# Patient Record
Sex: FEMALE | Race: WHITE | NOT HISPANIC OR LATINO | URBAN - METROPOLITAN AREA
[De-identification: names, ages, dates, MRNs, and addresses within clinical notes are randomized per-mention and may not be internally consistent; named-entity substitution may affect disease eponyms.]

---

## 2019-04-19 ENCOUNTER — INPATIENT (INPATIENT)
Facility: HOSPITAL | Age: 79
LOS: 2 days | Discharge: NURSING, REHAB OR LONG-TERM CARE | DRG: 312 | End: 2019-04-22
Attending: INTERNAL MEDICINE | Admitting: INTERNAL MEDICINE
Payer: MEDICARE

## 2019-04-19 VITALS — HEART RATE: 69 BPM | TEMPERATURE: 98 F | OXYGEN SATURATION: 97 % | RESPIRATION RATE: 18 BRPM

## 2019-04-19 LAB
ALBUMIN SERPL ELPH-MCNC: 2.4 G/DL — LOW (ref 3.4–5)
ALP SERPL-CCNC: 50 U/L — SIGNIFICANT CHANGE UP (ref 40–120)
ALT FLD-CCNC: 14 U/L — SIGNIFICANT CHANGE UP (ref 12–42)
ANION GAP SERPL CALC-SCNC: 9 MMOL/L — SIGNIFICANT CHANGE UP (ref 9–16)
APPEARANCE UR: CLEAR — SIGNIFICANT CHANGE UP
AST SERPL-CCNC: 20 U/L — SIGNIFICANT CHANGE UP (ref 15–37)
BASOPHILS NFR BLD AUTO: 1 % — SIGNIFICANT CHANGE UP (ref 0–2)
BILIRUB SERPL-MCNC: 0.3 MG/DL — SIGNIFICANT CHANGE UP (ref 0.2–1.2)
BILIRUB UR-MCNC: NEGATIVE — SIGNIFICANT CHANGE UP
BUN SERPL-MCNC: 13 MG/DL — SIGNIFICANT CHANGE UP (ref 7–23)
CALCIUM SERPL-MCNC: 6 MG/DL — CRITICAL LOW (ref 8.5–10.5)
CHLORIDE SERPL-SCNC: 117 MMOL/L — HIGH (ref 96–108)
CK SERPL-CCNC: 109 U/L — SIGNIFICANT CHANGE UP (ref 26–192)
CO2 SERPL-SCNC: 21 MMOL/L — LOW (ref 22–31)
COLOR SPEC: YELLOW — SIGNIFICANT CHANGE UP
CREAT SERPL-MCNC: 0.39 MG/DL — LOW (ref 0.5–1.3)
DIFF PNL FLD: NEGATIVE — SIGNIFICANT CHANGE UP
EOSINOPHIL NFR BLD AUTO: 1.8 % — SIGNIFICANT CHANGE UP (ref 0–6)
GLUCOSE SERPL-MCNC: 96 MG/DL — SIGNIFICANT CHANGE UP (ref 70–99)
GLUCOSE UR QL: NEGATIVE — SIGNIFICANT CHANGE UP
HCT VFR BLD CALC: 37.6 % — SIGNIFICANT CHANGE UP (ref 34.5–45)
HGB BLD-MCNC: 12.2 G/DL — SIGNIFICANT CHANGE UP (ref 11.5–15.5)
IMM GRANULOCYTES NFR BLD AUTO: 0.2 % — SIGNIFICANT CHANGE UP (ref 0–1.5)
KETONES UR-MCNC: ABNORMAL MG/DL
LACTATE SERPL-SCNC: 1.8 MMOL/L — SIGNIFICANT CHANGE UP (ref 0.4–2)
LEUKOCYTE ESTERASE UR-ACNC: NEGATIVE — SIGNIFICANT CHANGE UP
LYMPHOCYTES # BLD AUTO: 27.3 % — SIGNIFICANT CHANGE UP (ref 13–44)
MAGNESIUM SERPL-MCNC: 1.5 MG/DL — LOW (ref 1.6–2.6)
MCHC RBC-ENTMCNC: 28 PG — SIGNIFICANT CHANGE UP (ref 27–34)
MCHC RBC-ENTMCNC: 32.4 G/DL — SIGNIFICANT CHANGE UP (ref 32–36)
MCV RBC AUTO: 86.4 FL — SIGNIFICANT CHANGE UP (ref 80–100)
MONOCYTES NFR BLD AUTO: 8.7 % — SIGNIFICANT CHANGE UP (ref 2–14)
NEUTROPHILS NFR BLD AUTO: 61 % — SIGNIFICANT CHANGE UP (ref 43–77)
NITRITE UR-MCNC: NEGATIVE — SIGNIFICANT CHANGE UP
PH UR: 6.5 — SIGNIFICANT CHANGE UP (ref 5–8)
PHOSPHATE SERPL-MCNC: 2.1 MG/DL — LOW (ref 2.5–4.5)
PLATELET # BLD AUTO: 199 K/UL — SIGNIFICANT CHANGE UP (ref 150–400)
POTASSIUM SERPL-MCNC: 2.8 MMOL/L — CRITICAL HIGH (ref 3.5–5.3)
POTASSIUM SERPL-SCNC: 2.8 MMOL/L — CRITICAL HIGH (ref 3.5–5.3)
PROT SERPL-MCNC: 4.4 G/DL — LOW (ref 6.4–8.2)
PROT UR-MCNC: NEGATIVE MG/DL — SIGNIFICANT CHANGE UP
RBC # BLD: 4.35 M/UL — SIGNIFICANT CHANGE UP (ref 3.8–5.2)
RBC # FLD: 14.1 % — SIGNIFICANT CHANGE UP (ref 10.3–14.5)
SODIUM SERPL-SCNC: 147 MMOL/L — HIGH (ref 132–145)
SP GR SPEC: 1.01 — SIGNIFICANT CHANGE UP (ref 1–1.03)
TROPONIN I SERPL-MCNC: 0.02 NG/ML — SIGNIFICANT CHANGE UP (ref 0.02–0.06)
TSH SERPL-MCNC: 0.45 UIU/ML — SIGNIFICANT CHANGE UP (ref 0.36–3.74)
UROBILINOGEN FLD QL: 0.2 E.U./DL — SIGNIFICANT CHANGE UP
WBC # BLD: 6.2 K/UL — SIGNIFICANT CHANGE UP (ref 3.8–10.5)
WBC # FLD AUTO: 6.2 K/UL — SIGNIFICANT CHANGE UP (ref 3.8–10.5)

## 2019-04-19 PROCEDURE — 93010 ELECTROCARDIOGRAM REPORT: CPT

## 2019-04-19 PROCEDURE — 70450 CT HEAD/BRAIN W/O DYE: CPT | Mod: 26

## 2019-04-19 PROCEDURE — 99285 EMERGENCY DEPT VISIT HI MDM: CPT | Mod: 25

## 2019-04-19 RX ORDER — POTASSIUM CHLORIDE 20 MEQ
10 PACKET (EA) ORAL
Qty: 0 | Refills: 0 | Status: COMPLETED | OUTPATIENT
Start: 2019-04-19 | End: 2019-04-20

## 2019-04-19 RX ORDER — MAGNESIUM SULFATE 500 MG/ML
2 VIAL (ML) INJECTION ONCE
Qty: 0 | Refills: 0 | Status: COMPLETED | OUTPATIENT
Start: 2019-04-19 | End: 2019-04-19

## 2019-04-19 RX ORDER — POTASSIUM PHOSPHATE, MONOBASIC POTASSIUM PHOSPHATE, DIBASIC 236; 224 MG/ML; MG/ML
15 INJECTION, SOLUTION INTRAVENOUS ONCE
Qty: 0 | Refills: 0 | Status: COMPLETED | OUTPATIENT
Start: 2019-04-19 | End: 2019-04-19

## 2019-04-19 RX ORDER — SODIUM CHLORIDE 9 MG/ML
1500 INJECTION INTRAMUSCULAR; INTRAVENOUS; SUBCUTANEOUS ONCE
Qty: 0 | Refills: 0 | Status: COMPLETED | OUTPATIENT
Start: 2019-04-19 | End: 2019-04-19

## 2019-04-19 RX ORDER — CALCIUM GLUCONATE 100 MG/ML
2 VIAL (ML) INTRAVENOUS ONCE
Qty: 0 | Refills: 0 | Status: COMPLETED | OUTPATIENT
Start: 2019-04-19 | End: 2019-04-19

## 2019-04-19 RX ORDER — POTASSIUM CHLORIDE 20 MEQ
40 PACKET (EA) ORAL ONCE
Qty: 0 | Refills: 0 | Status: COMPLETED | OUTPATIENT
Start: 2019-04-19 | End: 2019-04-19

## 2019-04-19 RX ADMIN — SODIUM CHLORIDE 3000 MILLILITER(S): 9 INJECTION INTRAMUSCULAR; INTRAVENOUS; SUBCUTANEOUS at 20:39

## 2019-04-19 RX ADMIN — Medication 50 GRAM(S): at 23:01

## 2019-04-19 RX ADMIN — Medication 100 MILLIEQUIVALENT(S): at 23:01

## 2019-04-19 NOTE — ED PROVIDER NOTE - OBJECTIVE STATEMENT
78 yof pw weakness, LH/near syncope today at the restaurant.  pt was with family, visiting from [place] yesterday, reported a lot of walking/sight seeing both days.  tonight at dinner, pt states she felt weak, w/ near syncope, lowered her head on the table.  while her family was trying to get her to a taxi, they felt pt didn't have a lot of strength.  family reports pt was able to converse appropriately w/o slur of speech or noted any facial droop.  no reported LOC, or urinary/bm incontinence.  family does note pt would intermittently shake her arm.  EMT also reported similar findings upon arrival.  pt denies pain/sob/nv/headache.  family reports possible hx of tia/sz in the past (dx years ago in [place]), only takes aspirin, hx of low blood pressure.

## 2019-04-19 NOTE — ED PROVIDER NOTE - PHYSICAL EXAMINATION
CON: ao x 3, HENMT: clear oropharynx, soft neck, HEAD: atraumatic, CV: rrr, equal pulses b/l, RESP: cta b/l, GI: +BS, soft, nontender, no rebound, no guarding, SKIN: no rash, MSK: no deformities, NEURO: ao x 3, clear speech, no facial asymmetry, no rigidity, no clonus, noted intermittent myoclonus of the right arm

## 2019-04-19 NOTE — ED ADULT NURSE NOTE - CHIEF COMPLAINT QUOTE
BIBA for sudden episode of weakness and 'jerking arm movements' while at dinner with family. Patient c/o mild headache. As per family members, hx 'a few seizures in the past and a couple of mini strokes.' Otherwise no past medical history and only takes 1 baby asa daily. Unable to obtain blood pressure in triage due to jerking arm movements.

## 2019-04-19 NOTE — ED ADULT NURSE NOTE - OBJECTIVE STATEMENT
77 yo F presents to ED with family yfn tran. Pt family states they are visiting [facility] and have been walking around all day and during dinner the patient started stating she was not feeling well and felt "high and weak". Pt has noted twitching to bl arms at this time. Pt is a&Ox2 at this time. Pt has sign of trauma not head or body. no facial droop noted. Pt family states pt has no LOC. No slurred speech noted.

## 2019-04-19 NOTE — ED PROVIDER NOTE - CLINICAL SUMMARY MEDICAL DECISION MAKING FREE TEXT BOX
generalized weakness/fatigue, near syncope, no syncope, hx not c/w sz, though noted myoclonus, possibly secondary myoclonus, no rigidity or clonus on exam,  family reports pt prone to dehydration, dec UOP in the last 2 days, will check lytes, CT head, UA eval for possible infection

## 2019-04-19 NOTE — ED ADULT NURSE NOTE - NSIMPLEMENTINTERV_GEN_ALL_ED
Implemented All Fall with Harm Risk Interventions:  [place] to call system. Call bell, personal items and telephone within reach. Instruct patient to call for assistance. Room bathroom lighting operational. Non-slip footwear when patient is off stretcher. Physically safe environment: no spills, clutter or unnecessary equipment. Stretcher in lowest position, wheels locked, appropriate side rails in place. Provide visual cue, wrist band, yellow gown, etc. Monitor gait and stability. Monitor for mental status changes and reorient to person, place, and time. Review medications for side effects contributing to fall risk. Reinforce activity limits and safety measures with patient and family. Provide visual clues: red socks.

## 2019-04-19 NOTE — ED ADULT TRIAGE NOTE - CHIEF COMPLAINT QUOTE
BIBA for sudden episode of weakness and 'jerking arm movements' while at dinner with family. Patient c/o mild headache. As per family members, hx 'a few seizures in the past and a couple of mini strokes.' Otherwise no past medical history and only takes 1 baby asa daily. BIBA for sudden episode of weakness and 'jerking arm movements' while at dinner with family. Patient c/o mild headache. As per family members, hx 'a few seizures in the past and a couple of mini strokes.' Otherwise no past medical history and only takes 1 baby asa daily. Unable to obtain blood pressure in triage due to jerking arm movements.

## 2019-04-19 NOTE — ED PROVIDER NOTE - CARE PLAN
Principal Discharge DX:	Weakness  Secondary Diagnosis:	Hypocalcemia  Secondary Diagnosis:	Hypokalemia  Secondary Diagnosis:	Hypomagnesemia  Secondary Diagnosis:	Hypophosphatemia  Secondary Diagnosis:	Hypernatremia

## 2019-04-20 DIAGNOSIS — E83.39 OTHER DISORDERS OF PHOSPHORUS METABOLISM: ICD-10-CM

## 2019-04-20 DIAGNOSIS — R55 SYNCOPE AND COLLAPSE: ICD-10-CM

## 2019-04-20 DIAGNOSIS — E87.0 HYPEROSMOLALITY AND HYPERNATREMIA: ICD-10-CM

## 2019-04-20 DIAGNOSIS — E83.42 HYPOMAGNESEMIA: ICD-10-CM

## 2019-04-20 DIAGNOSIS — Z91.89 OTHER SPECIFIED PERSONAL RISK FACTORS, NOT ELSEWHERE CLASSIFIED: ICD-10-CM

## 2019-04-20 DIAGNOSIS — Z29.9 ENCOUNTER FOR PROPHYLACTIC MEASURES, UNSPECIFIED: ICD-10-CM

## 2019-04-20 DIAGNOSIS — E87.6 HYPOKALEMIA: ICD-10-CM

## 2019-04-20 LAB
ALBUMIN SERPL ELPH-MCNC: 3.6 G/DL — SIGNIFICANT CHANGE UP (ref 3.3–5)
ALP SERPL-CCNC: 54 U/L — SIGNIFICANT CHANGE UP (ref 40–120)
ALT FLD-CCNC: 18 U/L — SIGNIFICANT CHANGE UP (ref 10–45)
ANION GAP SERPL CALC-SCNC: 11 MMOL/L — SIGNIFICANT CHANGE UP (ref 5–17)
ANION GAP SERPL CALC-SCNC: 9 MMOL/L — SIGNIFICANT CHANGE UP (ref 5–17)
AST SERPL-CCNC: 32 U/L — SIGNIFICANT CHANGE UP (ref 10–40)
BILIRUB SERPL-MCNC: 0.3 MG/DL — SIGNIFICANT CHANGE UP (ref 0.2–1.2)
BUN SERPL-MCNC: 11 MG/DL — SIGNIFICANT CHANGE UP (ref 7–23)
BUN SERPL-MCNC: 9 MG/DL — SIGNIFICANT CHANGE UP (ref 7–23)
CALCIUM SERPL-MCNC: 8.4 MG/DL — SIGNIFICANT CHANGE UP (ref 8.4–10.5)
CALCIUM SERPL-MCNC: 8.8 MG/DL — SIGNIFICANT CHANGE UP (ref 8.4–10.5)
CHLORIDE SERPL-SCNC: 108 MMOL/L — SIGNIFICANT CHANGE UP (ref 96–108)
CHLORIDE SERPL-SCNC: 110 MMOL/L — HIGH (ref 96–108)
CK SERPL-CCNC: 152 U/L — SIGNIFICANT CHANGE UP (ref 25–170)
CO2 SERPL-SCNC: 20 MMOL/L — LOW (ref 22–31)
CO2 SERPL-SCNC: 22 MMOL/L — SIGNIFICANT CHANGE UP (ref 22–31)
CREAT SERPL-MCNC: 0.49 MG/DL — LOW (ref 0.5–1.3)
CREAT SERPL-MCNC: 0.51 MG/DL — SIGNIFICANT CHANGE UP (ref 0.5–1.3)
EXTRA LAVENDER TOP TUBE: SIGNIFICANT CHANGE UP
EXTRA SST TUBE: SIGNIFICANT CHANGE UP
FOLATE SERPL-MCNC: >20 NG/ML — SIGNIFICANT CHANGE UP
GLUCOSE SERPL-MCNC: 101 MG/DL — HIGH (ref 70–99)
GLUCOSE SERPL-MCNC: 112 MG/DL — HIGH (ref 70–99)
HCT VFR BLD CALC: 35.2 % — SIGNIFICANT CHANGE UP (ref 34.5–45)
HGB BLD-MCNC: 11.1 G/DL — LOW (ref 11.5–15.5)
MAGNESIUM SERPL-MCNC: 2.5 MG/DL — SIGNIFICANT CHANGE UP (ref 1.6–2.6)
MCHC RBC-ENTMCNC: 28 PG — SIGNIFICANT CHANGE UP (ref 27–34)
MCHC RBC-ENTMCNC: 31.5 GM/DL — LOW (ref 32–36)
MCV RBC AUTO: 88.7 FL — SIGNIFICANT CHANGE UP (ref 80–100)
NRBC # BLD: 0 /100 WBCS — SIGNIFICANT CHANGE UP (ref 0–0)
PHOSPHATE SERPL-MCNC: 4 MG/DL — SIGNIFICANT CHANGE UP (ref 2.5–4.5)
PLATELET # BLD AUTO: 196 K/UL — SIGNIFICANT CHANGE UP (ref 150–400)
POTASSIUM SERPL-MCNC: 4.7 MMOL/L — SIGNIFICANT CHANGE UP (ref 3.5–5.3)
POTASSIUM SERPL-MCNC: 5.4 MMOL/L — HIGH (ref 3.5–5.3)
POTASSIUM SERPL-SCNC: 4.7 MMOL/L — SIGNIFICANT CHANGE UP (ref 3.5–5.3)
POTASSIUM SERPL-SCNC: 5.4 MMOL/L — HIGH (ref 3.5–5.3)
PROT SERPL-MCNC: 5.9 G/DL — LOW (ref 6–8.3)
RBC # BLD: 3.97 M/UL — SIGNIFICANT CHANGE UP (ref 3.8–5.2)
RBC # FLD: 13.7 % — SIGNIFICANT CHANGE UP (ref 10.3–14.5)
SODIUM SERPL-SCNC: 139 MMOL/L — SIGNIFICANT CHANGE UP (ref 135–145)
SODIUM SERPL-SCNC: 141 MMOL/L — SIGNIFICANT CHANGE UP (ref 135–145)
VIT B12 SERPL-MCNC: 471 PG/ML — SIGNIFICANT CHANGE UP (ref 232–1245)
WBC # BLD: 7.96 K/UL — SIGNIFICANT CHANGE UP (ref 3.8–10.5)
WBC # FLD AUTO: 7.96 K/UL — SIGNIFICANT CHANGE UP (ref 3.8–10.5)

## 2019-04-20 PROCEDURE — 93010 ELECTROCARDIOGRAM REPORT: CPT

## 2019-04-20 PROCEDURE — 99223 1ST HOSP IP/OBS HIGH 75: CPT

## 2019-04-20 PROCEDURE — 71045 X-RAY EXAM CHEST 1 VIEW: CPT | Mod: 26

## 2019-04-20 RX ORDER — LANOLIN ALCOHOL/MO/W.PET/CERES
5 CREAM (GRAM) TOPICAL ONCE
Qty: 0 | Refills: 0 | Status: COMPLETED | OUTPATIENT
Start: 2019-04-20 | End: 2019-04-20

## 2019-04-20 RX ORDER — ASPIRIN/CALCIUM CARB/MAGNESIUM 324 MG
1 TABLET ORAL
Qty: 0 | Refills: 0 | COMMUNITY

## 2019-04-20 RX ORDER — HEPARIN SODIUM 5000 [USP'U]/ML
5000 INJECTION INTRAVENOUS; SUBCUTANEOUS EVERY 8 HOURS
Qty: 0 | Refills: 0 | Status: DISCONTINUED | OUTPATIENT
Start: 2019-04-20 | End: 2019-04-22

## 2019-04-20 RX ORDER — LANOLIN ALCOHOL/MO/W.PET/CERES
5 CREAM (GRAM) TOPICAL AT BEDTIME
Qty: 0 | Refills: 0 | Status: DISCONTINUED | OUTPATIENT
Start: 2019-04-20 | End: 2019-04-22

## 2019-04-20 RX ORDER — ASPIRIN/CALCIUM CARB/MAGNESIUM 324 MG
81 TABLET ORAL DAILY
Qty: 0 | Refills: 0 | Status: DISCONTINUED | OUTPATIENT
Start: 2019-04-20 | End: 2019-04-22

## 2019-04-20 RX ADMIN — Medication 200 GRAM(S): at 00:03

## 2019-04-20 RX ADMIN — Medication 100 MILLIEQUIVALENT(S): at 01:54

## 2019-04-20 RX ADMIN — Medication 5 MILLIGRAM(S): at 22:50

## 2019-04-20 RX ADMIN — Medication 40 MILLIEQUIVALENT(S): at 00:35

## 2019-04-20 RX ADMIN — HEPARIN SODIUM 5000 UNIT(S): 5000 INJECTION INTRAVENOUS; SUBCUTANEOUS at 06:14

## 2019-04-20 RX ADMIN — HEPARIN SODIUM 5000 UNIT(S): 5000 INJECTION INTRAVENOUS; SUBCUTANEOUS at 22:28

## 2019-04-20 RX ADMIN — POTASSIUM PHOSPHATE, MONOBASIC POTASSIUM PHOSPHATE, DIBASIC 62.5 MILLIMOLE(S): 236; 224 INJECTION, SOLUTION INTRAVENOUS at 00:34

## 2019-04-20 RX ADMIN — Medication 81 MILLIGRAM(S): at 13:07

## 2019-04-20 RX ADMIN — Medication 100 MILLIEQUIVALENT(S): at 00:03

## 2019-04-20 RX ADMIN — HEPARIN SODIUM 5000 UNIT(S): 5000 INJECTION INTRAVENOUS; SUBCUTANEOUS at 14:08

## 2019-04-20 RX ADMIN — Medication 5 MILLIGRAM(S): at 20:44

## 2019-04-20 NOTE — H&P ADULT - NSHPPHYSICALEXAM_GEN_ALL_CORE
.  VITAL SIGNS:  T(C): 36.7 (04-20-19 @ 01:38), Max: 36.8 (04-19-19 @ 20:50)  T(F): 98.1 (04-20-19 @ 01:38), Max: 98.2 (04-19-19 @ 20:50)  HR: 65 (04-20-19 @ 01:38) (64 - 78)  BP: 126/72 (04-20-19 @ 01:38) (126/72 - 173/79)  BP(mean): --  RR: 18 (04-20-19 @ 01:38) (18 - 18)  SpO2: 95% (04-20-19 @ 01:38) (95% - 98%)  Wt(kg): --    PHYSICAL EXAM:    Constitutional: WDWN resting comfortably in bed; NAD  Head: NC/AT  Eyes: PERRL, EOMI  ENT: no nasal discharge  Neck: supple  Respiratory: CTA b/l, no increased work of breathing  Cardiac: +S1/S2, regular rate  Gastrointestinal: soft, distended, nontender to palpation, no rebound or guarding; +BS  Extremities: WWP, no peripheral edema  Musculoskeletal: NROM x4  Dermatologic: skin warm, dry  Neurologic: Alert and oriented. PERRL, EOMI. Face symmetric to eyebrow raise and smile. Tongue protrusion midline. Visual fields intact. Shoulder shrug 5/5. Motor 5/5 in all extremities. Sensation intact to light touch. No dysarthria. No focal deficits appreciated.   Psychiatric: affect and characteristics of appearance, verbalizations, behaviors are appropriate

## 2019-04-20 NOTE — H&P ADULT - PROBLEM SELECTOR PLAN 7
1) PCP Unknown  Contacted on Admission: Patient and family at bedside do not recall PCP. She is [place] of [place]. Will attempt to find PCP   2) Date of Contact with PCP:  3) PCP Contacted at Discharge: (Y/N)  4) Summary of Handoff Given to PCP:   5) Post-Discharge Appointment Date and Location:

## 2019-04-20 NOTE — H&P ADULT - ATTENDING COMMENTS
Patient was seen and examined by me at bedside. I agree with resident's note, subjective, objective physical exam, assessment and plan with following modifications/additions.      1) movement disorder (choreiform movements) --DDx partial seizures? chorea? restless leg?  * Will discuss with patient and family possibility of vEEG  * Need Neurology consultation.   * Family denies home medications.     2) electrolyte disturbance.   resolved.

## 2019-04-20 NOTE — H&P ADULT - HISTORY OF PRESENT ILLNESS
Patient is a 78 year old F with PMH HLD who presents for presyncope. Patient was in her usual state of health prior to Friday, visiting the city with her daughter and out at dinner at 5:30pm. When appetizers came, she told her daughter she suddenly felt "high", and that stationary objects appeared to be moving. She had associated dizziness, and was speaking very slowly. Her daughter and son went to take her home from dinner but she felt very tired and generalized weakness, so they called EMS. At the time of the incident, she denies blurry vision, slurred speech, facial droop, or focal motor/sensory deficits. Of note, patient with recent carotid ultrasound without significant stenosis per daughter    At [facility] ED T36.6, HR 69, bp 130-170s, RR18 sat 97% RA. Patient noted to have several lab abnormalities including Na 147, K 2.8, Cl 117, calcium 6.0, Mg 1.5. UA negative. CT head noncontrast negative for acute pathology. Patient was given 2g Ca, 2g Mg, 40meq K, 15mMol K Phos, and a 1.5L NS bolus. Concern for contamination of labs by fluids.

## 2019-04-20 NOTE — PHYSICAL THERAPY INITIAL EVALUATION ADULT - LEVEL OF INDEPENDENCE: SIT/STAND, REHAB EVAL
unable to perform/secondary to poor static sitting tolerance requiring max assist to prevent patient from thrashing about with torso and head/neck

## 2019-04-20 NOTE — H&P ADULT - PROBLEM SELECTOR PLAN 3
- Patient with hypertension on admission, hypernatremia with hypokalemia. May benefit from workup of secondary causes of hypertension. F/u renin:aldost ratio

## 2019-04-20 NOTE — PHYSICAL THERAPY INITIAL EVALUATION ADULT - CRITERIA FOR SKILLED THERAPEUTIC INTERVENTIONS
impairments found/functional limitations in following categories/risk reduction/prevention/rehab potential/therapy frequency/anticipated discharge recommendation

## 2019-04-20 NOTE — PHYSICAL THERAPY INITIAL EVALUATION ADULT - DISCHARGE DISPOSITION, PT EVAL
TBD pending further exam/evaluation and further work up by medical team prior to PT intervention regarding flailing/thrashing/jerking motions upon transition of supine to sit

## 2019-04-20 NOTE — PHYSICAL THERAPY INITIAL EVALUATION ADULT - BED MOBILITY LIMITATIONS, REHAB EVAL
decreased ability to use legs for bridging/pushing/flailing movements of neck and trunk with supine to sit transition with two "jerking" motions noted with eye closing. MD (Kvng) informed immediately

## 2019-04-20 NOTE — PHYSICAL THERAPY INITIAL EVALUATION ADULT - GENERAL OBSERVATIONS, REHAB EVAL
Received supine in bed with +family present, on room air, +hep lock, in no apparent distress Patient appears to be resting comfortably in bed

## 2019-04-20 NOTE — H&P ADULT - PROBLEM SELECTOR PLAN 1
- Patient with self-reported poor PO intake and several episodes in the past of presyncope  - Carotid U/S normal per daughter at bedside  - EKG no acute changes, TWI V1-V2 with no symptoms unlikely ACS. Trop and CK negative  - Unlikely seizure given event description though could consider EEG if additional workup negative  - Less likely stroke given global deficits and transient nature, more likely metabolic  - Replete lytes and monitor neurologic status  - Daughter at bedside reports normal echo, obtain collateral. Consider echo if not obtainable

## 2019-04-20 NOTE — PHYSICAL THERAPY INITIAL EVALUATION ADULT - PLANNED THERAPY INTERVENTIONS, PT EVAL
strengthening/transfer training/gait training/postural re-education/balance training/bed mobility training

## 2019-04-20 NOTE — PHYSICAL THERAPY INITIAL EVALUATION ADULT - MODALITIES TREATMENT COMMENTS
FIM locomotion TBA; FIM stairs TBA; patient displays oscillopsia upon supine to sit transition with self-reported severe dizziness with patient stating that the room looked as though it were moving, not spinning

## 2019-04-20 NOTE — PHYSICAL THERAPY INITIAL EVALUATION ADULT - LEVEL OF INDEPENDENCE: GAIT, REHAB EVAL
unable to perform/flailing/thrashing movements of trunk/head/neck with endorsed dizziness and room moving

## 2019-04-20 NOTE — H&P ADULT - NSHPLABSRESULTS_GEN_ALL_CORE
.  LABS:                         11.1   7.96  )-----------( 196      ( 2019 02:55 )             35.2         147<H>  |  117<H>  |  13  ----------------------------<  96  2.8<HH>   |  21<L>  |  0.39<L>    Ca    6.0<LL>      2019 20:38  Phos  2.1       Mg     1.5         TPro  4.4<L>  /  Alb  2.4<L>  /  TBili  0.3  /  DBili  x   /  AST  20  /  ALT  14  /  AlkPhos  50        Urinalysis Basic - ( 2019 22:43 )    Color: Yellow / Appearance: Clear / S.010 / pH: x  Gluc: x / Ketone: Trace mg/dL  / Bili: NEGATIVE / Urobili: 0.2 E.U./dL   Blood: x / Protein: NEGATIVE mg/dL / Nitrite: NEGATIVE   Leuk Esterase: NEGATIVE / RBC: x / WBC x   Sq Epi: x / Non Sq Epi: x / Bacteria: x      CARDIAC MARKERS ( 2019 20:38 )  0.020 ng/mL / x     / 109 U/L / x     / x            Lactate, Blood: 1.8 mmoL/L ( @ 20:38)      RADIOLOGY, EKG & ADDITIONAL TESTS: Reviewed.

## 2019-04-20 NOTE — H&P ADULT - NSHPREVIEWOFSYSTEMS_GEN_ALL_CORE
REVIEW OF SYSTEMS:    CONSTITUTIONAL: Generalized weakness as per HPI  EYES/ENT: Visual changes as per HPI, now resolved  NECK: No pain  RESPIRATORY: No cough, wheezing, hemoptysis; No shortness of breath  CARDIOVASCULAR: No chest pain or palpitations  GASTROINTESTINAL: No abdominal or epigastric pain. No nausea, vomiting, or hematemesis; No diarrhea or constipation.  GENITOURINARY: No dysuria  NEUROLOGICAL: As per HPI  SKIN: No itching, burning, rashes, or lesions   All other review of systems is negative unless indicated above.

## 2019-04-20 NOTE — PHYSICAL THERAPY INITIAL EVALUATION ADULT - PERTINENT HX OF CURRENT PROBLEM, REHAB EVAL
Patient is a 78 year old F with PMH HLD who presents for presyncope. Patient was in her usual state of health prior to Friday, visiting the city with her daughter and out at dinner at 5:30pm. When appetizers came, she told her daughter she suddenly felt "high", and that stationary objects appeared to be moving. She had associated dizziness, and was speaking very slowly. Her daughter and son went to take her home from dinner but she felt very tired and generalized weakness, so they called EMS.

## 2019-04-20 NOTE — PHYSICAL THERAPY INITIAL EVALUATION ADULT - ADDITIONAL COMMENTS
Patient lives alone in an independent living facility where she was previously independent for all functional mobility and ADLs without assistive device or assistance required. Denies recent history of falls. Endorses chronic dizziness with positional changes.

## 2019-04-20 NOTE — PHYSICAL THERAPY INITIAL EVALUATION ADULT - SITTING BALANCE: DYNAMIC
TBA - unable to progress past static sitting due to unsafe flailing/thrashing motions of head/neck/torso

## 2019-04-21 LAB
ANION GAP SERPL CALC-SCNC: 9 MMOL/L — SIGNIFICANT CHANGE UP (ref 5–17)
BUN SERPL-MCNC: 8 MG/DL — SIGNIFICANT CHANGE UP (ref 7–23)
CALCIUM SERPL-MCNC: 8.8 MG/DL — SIGNIFICANT CHANGE UP (ref 8.4–10.5)
CHLORIDE SERPL-SCNC: 102 MMOL/L — SIGNIFICANT CHANGE UP (ref 96–108)
CO2 SERPL-SCNC: 24 MMOL/L — SIGNIFICANT CHANGE UP (ref 22–31)
CREAT SERPL-MCNC: 0.51 MG/DL — SIGNIFICANT CHANGE UP (ref 0.5–1.3)
CULTURE RESULTS: NO GROWTH — SIGNIFICANT CHANGE UP
GLUCOSE SERPL-MCNC: 84 MG/DL — SIGNIFICANT CHANGE UP (ref 70–99)
HCT VFR BLD CALC: 37.2 % — SIGNIFICANT CHANGE UP (ref 34.5–45)
HGB BLD-MCNC: 11.9 G/DL — SIGNIFICANT CHANGE UP (ref 11.5–15.5)
MAGNESIUM SERPL-MCNC: 1.9 MG/DL — SIGNIFICANT CHANGE UP (ref 1.6–2.6)
MCHC RBC-ENTMCNC: 28.3 PG — SIGNIFICANT CHANGE UP (ref 27–34)
MCHC RBC-ENTMCNC: 32 GM/DL — SIGNIFICANT CHANGE UP (ref 32–36)
MCV RBC AUTO: 88.6 FL — SIGNIFICANT CHANGE UP (ref 80–100)
NRBC # BLD: 0 /100 WBCS — SIGNIFICANT CHANGE UP (ref 0–0)
PLATELET # BLD AUTO: 196 K/UL — SIGNIFICANT CHANGE UP (ref 150–400)
POTASSIUM SERPL-MCNC: 3.9 MMOL/L — SIGNIFICANT CHANGE UP (ref 3.5–5.3)
POTASSIUM SERPL-SCNC: 3.9 MMOL/L — SIGNIFICANT CHANGE UP (ref 3.5–5.3)
RBC # BLD: 4.2 M/UL — SIGNIFICANT CHANGE UP (ref 3.8–5.2)
RBC # FLD: 13.6 % — SIGNIFICANT CHANGE UP (ref 10.3–14.5)
SODIUM SERPL-SCNC: 135 MMOL/L — SIGNIFICANT CHANGE UP (ref 135–145)
SPECIMEN SOURCE: SIGNIFICANT CHANGE UP
WBC # BLD: 7.83 K/UL — SIGNIFICANT CHANGE UP (ref 3.8–10.5)
WBC # FLD AUTO: 7.83 K/UL — SIGNIFICANT CHANGE UP (ref 3.8–10.5)

## 2019-04-21 PROCEDURE — 95951: CPT | Mod: 26

## 2019-04-21 PROCEDURE — 99233 SBSQ HOSP IP/OBS HIGH 50: CPT

## 2019-04-21 PROCEDURE — 99222 1ST HOSP IP/OBS MODERATE 55: CPT

## 2019-04-21 RX ORDER — ACETAMINOPHEN 500 MG
650 TABLET ORAL ONCE
Qty: 0 | Refills: 0 | Status: COMPLETED | OUTPATIENT
Start: 2019-04-21 | End: 2019-04-21

## 2019-04-21 RX ADMIN — Medication 81 MILLIGRAM(S): at 10:37

## 2019-04-21 RX ADMIN — HEPARIN SODIUM 5000 UNIT(S): 5000 INJECTION INTRAVENOUS; SUBCUTANEOUS at 05:27

## 2019-04-21 RX ADMIN — HEPARIN SODIUM 5000 UNIT(S): 5000 INJECTION INTRAVENOUS; SUBCUTANEOUS at 22:56

## 2019-04-21 RX ADMIN — HEPARIN SODIUM 5000 UNIT(S): 5000 INJECTION INTRAVENOUS; SUBCUTANEOUS at 14:04

## 2019-04-21 RX ADMIN — Medication 650 MILLIGRAM(S): at 11:30

## 2019-04-21 RX ADMIN — Medication 5 MILLIGRAM(S): at 22:56

## 2019-04-21 RX ADMIN — Medication 10 MILLIGRAM(S): at 10:38

## 2019-04-21 RX ADMIN — Medication 650 MILLIGRAM(S): at 10:37

## 2019-04-21 NOTE — PROGRESS NOTE ADULT - ASSESSMENT
77yo F, PMH of pre-syncope.     1) movement disorder (choreiform movements) --DDx partial seizures? chorea? restless leg?  Seems improved.   * Getting vEEG  * Pending Neurology consultation.   * PT eval.   Likely for GUSTAVO    2) electrolyte disturbance.   resolved.

## 2019-04-21 NOTE — PROGRESS NOTE ADULT - ATTENDING COMMENTS
Presyncope appears likely 2/2 orthostasis given strong positional component vs. cardiac etiology.  Suspect prior episodes were similiarly BP related.  Etiology of twitching episodes yest is unclear, may have been myoclonic jerks, completely resolved today.  Low suspicion for seizures.  EEg in place, so far normal.  Low suspicion for stroke or other acute intracranial process    Recs:  Orthostatic VS  IV hydration   Recently had carotid doppler as an outpt- reportedly nl, would retrieve and confirm that  Addtl cardiac workup as per primary team  PT  Can have MR brain wo con as an outpt  Has pending neuro appt [place]

## 2019-04-21 NOTE — PROGRESS NOTE ADULT - SUBJECTIVE AND OBJECTIVE BOX
79 yo F with hx HTN admitted after presyncopal episode at dinner Friday, in which while sitting at dinner with family she reported feeling faint and said she felt "high".  Unable to walk, reported generalized weakness and was unable to hold body weight when standing, requiring assistance from family.  Brought into the ED, where whenever she would sit up would develop worsening lightheadedness and became more lethargic, improved with lying down. Also noted by family and hosp staf to have involuntary twitching movement of the whole body (head, trunk, arms).  Pt recalls these movts and felt as if she couldnt control them.  No LOC.  movts reportedly started yesterday, have never been seen by family before.  Resolved by today.    In ED, found to have multiple electrolyte abnormalities, although these were felt likely 2/2 fluid contamination.  Repeat studies within few hours revealed normal electrolytes. Neg UA and CTH.  Given IVF    Reports having similiar symptoms of feeling faint back in December, adm to [facility] in [place], where work up was negative and family was told he blood pressure "was all over the place".  Pt reports intermittently feeling lightheaded when changing position    Has a remote his of fainting back in [place] while walking down the stairs, was told she had "mini strokes", although hx does not suggest any focality to her symptoms.  Family was in [place] at the time and therefore do not know additional info about these episodes.    ICU Vital Signs Last 24 Hrs  T(C): 36.9 (21 Apr 2019 05:21), Max: 36.9 (20 Apr 2019 14:27)  T(F): 98.5 (21 Apr 2019 05:21), Max: 98.5 (20 Apr 2019 14:27)  HR: 58 (21 Apr 2019 05:21) (57 - 60)  BP: 124/73 (21 Apr 2019 05:21) (124/73 - 137/81)  BP(mean): --  ABP: --  ABP(mean): --  RR: 18 (21 Apr 2019 05:21) (16 - 18)  SpO2: 96% (21 Apr 2019 05:21) (95% - 97%)      Exam  Ms- heard of hearing, AAOx3, follows commands, speech fluent and nondysarthric  CN- PERRL, EOM with few bts of nystag on bilat end gaze likely physiologic, face symm, v1-3 intact, tongue midline  M- 5/5 bilat UE  At least 4+/5 bilat hip flexion, otherwise 5/5 LEs  S- intact to LT and PP  R- 2+ throughout, toes down  C- FTn intact bilat, finger tapping normal  G- difficulty rising to standing position, reports inc lightheadedness when standing, some unsteadiness but able to ambulate slowly w 1 person assist    CTH- There are prominent ventricles and sulci most compatible with   age-appropriate generalized parenchymal volume loss. The gray-white   matter differentiation is preserved. Mild patchy periventricular   hypodensities in the supratentorial white matter are suggestive of   chronic microvascular ischemic disease. There is no hemorrhage or mass   effect.

## 2019-04-21 NOTE — PROGRESS NOTE ADULT - SUBJECTIVE AND OBJECTIVE BOX
CC: feeling better today.   No further choreiform movements.   Some "twitching"  Denies cp, sob, dizziness.   Rest of ROS negative.    Vital Signs Last 24 Hrs  T(C): 36.9 (21 Apr 2019 05:21), Max: 36.9 (20 Apr 2019 14:27)  T(F): 98.5 (21 Apr 2019 05:21), Max: 98.5 (20 Apr 2019 14:27)  HR: 58 (21 Apr 2019 05:21) (57 - 60)  BP: 124/73 (21 Apr 2019 05:21) (124/73 - 137/81)  BP(mean): --  RR: 18 (21 Apr 2019 05:21) (16 - 18)  SpO2: 96% (21 Apr 2019 05:21) (95% - 97%)    PHYSICAL EXAMINATION  * General: Not in acute distress. Awake and alert. Lying comfortably in bed.  * Lungs: Clear to auscultation, no rales, no wheezes.  * Cardio: Regular rate and rhythm, no murmurs, no rubs, no gallops. Good peripheral pulses.  * Abdomen: Soft, non-tender, non-distended, tympanic to percussion, no rebound, no guarding, no rigidity. Bowel sounds present. No suprapubic or CVA tenderness.  * Extremities: Acyanotic, no edema.  * Skin: Warm and dry.  * Neuro: Alert and oriented x 3. No focal deficits. Motor strength is 5/5 throughout. Sensation intact. Cranial nerves II-XII grossly intact.                           11.9   7.83  )-----------( 196      ( 21 Apr 2019 05:56 )             37.2     04-21    135  |  102  |  8   ----------------------------<  84  3.9   |  24  |  0.51    Ca    8.8      21 Apr 2019 05:57  Phos  4.0     04-20  Mg     1.9     04-21    TPro  5.9<L>  /  Alb  3.6  /  TBili  0.3  /  DBili  x   /  AST  32  /  ALT  18  /  AlkPhos  54  04-20    MEDICATIONS  (STANDING):  aspirin enteric coated 81 milliGRAM(s) Oral daily  heparin  Injectable 5000 Unit(s) SubCutaneous every 8 hours  melatonin 5 milliGRAM(s) Oral at bedtime    MEDICATIONS  (PRN):  bisacodyl Suppository 10 milliGRAM(s) Rectal daily PRN Constipation

## 2019-04-22 VITALS
SYSTOLIC BLOOD PRESSURE: 122 MMHG | RESPIRATION RATE: 17 BRPM | DIASTOLIC BLOOD PRESSURE: 75 MMHG | HEART RATE: 58 BPM | TEMPERATURE: 98 F | OXYGEN SATURATION: 97 %

## 2019-04-22 LAB
ANION GAP SERPL CALC-SCNC: 12 MMOL/L — SIGNIFICANT CHANGE UP (ref 5–17)
BUN SERPL-MCNC: 7 MG/DL — SIGNIFICANT CHANGE UP (ref 7–23)
CALCIUM SERPL-MCNC: 8.9 MG/DL — SIGNIFICANT CHANGE UP (ref 8.4–10.5)
CHLORIDE SERPL-SCNC: 98 MMOL/L — SIGNIFICANT CHANGE UP (ref 96–108)
CO2 SERPL-SCNC: 21 MMOL/L — LOW (ref 22–31)
CREAT SERPL-MCNC: 0.51 MG/DL — SIGNIFICANT CHANGE UP (ref 0.5–1.3)
GLUCOSE SERPL-MCNC: 91 MG/DL — SIGNIFICANT CHANGE UP (ref 70–99)
HCT VFR BLD CALC: 39 % — SIGNIFICANT CHANGE UP (ref 34.5–45)
HGB BLD-MCNC: 12.6 G/DL — SIGNIFICANT CHANGE UP (ref 11.5–15.5)
MAGNESIUM SERPL-MCNC: 1.9 MG/DL — SIGNIFICANT CHANGE UP (ref 1.6–2.6)
MCHC RBC-ENTMCNC: 28.6 PG — SIGNIFICANT CHANGE UP (ref 27–34)
MCHC RBC-ENTMCNC: 32.3 GM/DL — SIGNIFICANT CHANGE UP (ref 32–36)
MCV RBC AUTO: 88.4 FL — SIGNIFICANT CHANGE UP (ref 80–100)
NRBC # BLD: 0 /100 WBCS — SIGNIFICANT CHANGE UP (ref 0–0)
PLATELET # BLD AUTO: 170 K/UL — SIGNIFICANT CHANGE UP (ref 150–400)
POTASSIUM SERPL-MCNC: 4.2 MMOL/L — SIGNIFICANT CHANGE UP (ref 3.5–5.3)
POTASSIUM SERPL-SCNC: 4.2 MMOL/L — SIGNIFICANT CHANGE UP (ref 3.5–5.3)
RBC # BLD: 4.41 M/UL — SIGNIFICANT CHANGE UP (ref 3.8–5.2)
RBC # FLD: 13.3 % — SIGNIFICANT CHANGE UP (ref 10.3–14.5)
SODIUM SERPL-SCNC: 131 MMOL/L — LOW (ref 135–145)
WBC # BLD: 6.66 K/UL — SIGNIFICANT CHANGE UP (ref 3.8–10.5)
WBC # FLD AUTO: 6.66 K/UL — SIGNIFICANT CHANGE UP (ref 3.8–10.5)

## 2019-04-22 PROCEDURE — 80048 BASIC METABOLIC PNL TOTAL CA: CPT

## 2019-04-22 PROCEDURE — 99238 HOSP IP/OBS DSCHRG MGMT 30/<: CPT

## 2019-04-22 PROCEDURE — 87086 URINE CULTURE/COLONY COUNT: CPT

## 2019-04-22 PROCEDURE — 97116 GAIT TRAINING THERAPY: CPT

## 2019-04-22 PROCEDURE — 82550 ASSAY OF CK (CPK): CPT

## 2019-04-22 PROCEDURE — 82746 ASSAY OF FOLIC ACID SERUM: CPT

## 2019-04-22 PROCEDURE — 93005 ELECTROCARDIOGRAM TRACING: CPT

## 2019-04-22 PROCEDURE — 82607 VITAMIN B-12: CPT

## 2019-04-22 PROCEDURE — 80053 COMPREHEN METABOLIC PANEL: CPT

## 2019-04-22 PROCEDURE — 96375 TX/PRO/DX INJ NEW DRUG ADDON: CPT

## 2019-04-22 PROCEDURE — 95951: CPT

## 2019-04-22 PROCEDURE — 71045 X-RAY EXAM CHEST 1 VIEW: CPT

## 2019-04-22 PROCEDURE — 99285 EMERGENCY DEPT VISIT HI MDM: CPT | Mod: 25

## 2019-04-22 PROCEDURE — 81003 URINALYSIS AUTO W/O SCOPE: CPT

## 2019-04-22 PROCEDURE — 85025 COMPLETE CBC W/AUTO DIFF WBC: CPT

## 2019-04-22 PROCEDURE — 83605 ASSAY OF LACTIC ACID: CPT

## 2019-04-22 PROCEDURE — 84443 ASSAY THYROID STIM HORMONE: CPT

## 2019-04-22 PROCEDURE — 36415 COLL VENOUS BLD VENIPUNCTURE: CPT

## 2019-04-22 PROCEDURE — 96374 THER/PROPH/DIAG INJ IV PUSH: CPT

## 2019-04-22 PROCEDURE — 84484 ASSAY OF TROPONIN QUANT: CPT

## 2019-04-22 PROCEDURE — 96376 TX/PRO/DX INJ SAME DRUG ADON: CPT

## 2019-04-22 PROCEDURE — 84100 ASSAY OF PHOSPHORUS: CPT

## 2019-04-22 PROCEDURE — 82962 GLUCOSE BLOOD TEST: CPT

## 2019-04-22 PROCEDURE — 70450 CT HEAD/BRAIN W/O DYE: CPT

## 2019-04-22 PROCEDURE — 83735 ASSAY OF MAGNESIUM: CPT

## 2019-04-22 PROCEDURE — 97530 THERAPEUTIC ACTIVITIES: CPT

## 2019-04-22 PROCEDURE — 85027 COMPLETE CBC AUTOMATED: CPT

## 2019-04-22 RX ADMIN — HEPARIN SODIUM 5000 UNIT(S): 5000 INJECTION INTRAVENOUS; SUBCUTANEOUS at 07:13

## 2019-04-22 NOTE — DISCHARGE NOTE PROVIDER - NSDCCPCAREPLAN_GEN_ALL_CORE_FT
PRINCIPAL DISCHARGE DIAGNOSIS  Diagnosis: Weakness  Assessment and Plan of Treatment: You came to the hospital becasue of weakness and dizziness. You were found to have high sodium, low potassium, low phosphate in your blood which was given to you from IV. This can happen from not eating/drinking much.  You were seen by our Neurology team who monitored you by placing electrodes around your head. You were NOT found to have any seizures. Please continue to drink water and eat regularly. You will ge going to rehab to work on your strength.

## 2019-04-22 NOTE — DISCHARGE NOTE NURSING/CASE MANAGEMENT/SOCIAL WORK - NSDCDPATPORTLINK_GEN_ALL_CORE
You can access the [facility], offered by [facility], by registering with the following website: [email or web address]

## 2019-04-22 NOTE — DISCHARGE NOTE PROVIDER - HOSPITAL COURSE
This is a 78 year old F with PMH HLD admitted for presyncope. She was found to have multiple electrolyte derangements ( which were contaminated). Her electrolytes were repleted and she was given fluids. CTH was negative. She was evaluated by Neurology as there were concerns of twitching on exam . Her EEG was negative for seizures. She was seen by PT and recommended for GUSTAVO.         < from: CT Head No Cont (04.19.19 @ 21:11) >        IMPRESSION:         No acute intracranial abnormality.            < end of copied text >

## 2019-04-22 NOTE — DISCHARGE NOTE PROVIDER - NSDCFUADDAPPT_GEN_ALL_CORE_FT
Patient and daughter ( who is from out of state) unable to provide PCP information but states that they have an appointment on Wednesday 4/24 for follow up.

## 2019-04-24 PROBLEM — Z00.00 ENCOUNTER FOR PREVENTIVE HEALTH EXAMINATION: Status: ACTIVE | Noted: 2019-04-24

## 2019-04-26 DIAGNOSIS — Z79.82 LONG TERM (CURRENT) USE OF ASPIRIN: ICD-10-CM

## 2019-04-26 DIAGNOSIS — R55 SYNCOPE AND COLLAPSE: ICD-10-CM

## 2019-04-26 DIAGNOSIS — E78.5 HYPERLIPIDEMIA, UNSPECIFIED: ICD-10-CM

## 2019-04-26 DIAGNOSIS — R25.3 FASCICULATION: ICD-10-CM

## 2019-04-26 DIAGNOSIS — R53.1 WEAKNESS: ICD-10-CM

## 2019-04-26 DIAGNOSIS — Z86.73 PERSONAL HISTORY OF TRANSIENT ISCHEMIC ATTACK (TIA), AND CEREBRAL INFARCTION WITHOUT RESIDUAL DEFICITS: ICD-10-CM

## 2021-04-22 NOTE — ED ADULT TRIAGE NOTE - LOCATION:
Caller: Marleny Greenwood    Relationship: Self    Best call back number: [phone]     Medication needed:   Requested Prescriptions     Pending Prescriptions Disp Refills   • promethazine (PHENERGAN) 25 MG tablet 90 tablet 3     Sig: Take 1 tablet by mouth Every 6 (Six) Hours As Needed for Nausea or Vomiting.   • butalbital-aspirin-caffeine-codeine (Fiorinal/Codeine #3) -82-30 MG capsule 120 capsule 0     Sig: Take 1 capsule by mouth Every 4 (Four) Hours As Needed for Headache.       When do you need the refill by: 04/22/21    What additional details did the patient provide when requesting the medication: PATIENT IS COMPLETELY OUT OF MEDICATION    Does the patient have less than a 3 day supply:  [x] Yes  [] No    What is the patient's preferred pharmacy: [facility] - [address] [phone] [facility] [phone]              
Left arm;

## 2023-09-29 NOTE — PHYSICAL THERAPY INITIAL EVALUATION ADULT - LEVEL OF INDEPENDENCE: SUPINE/SIT, REHAB EVAL
Mammogram shows a BI-RADS Category 2, benign    Recommended yearly mammograms    There was a small asymmetry within the right breast but this is similar to the prior examination in 2022 so we will continue monitoring yearly    No suspicious masses noted
minimum assist (75% patients effort)
